# Patient Record
Sex: MALE | Race: BLACK OR AFRICAN AMERICAN | NOT HISPANIC OR LATINO | Employment: FULL TIME | URBAN - METROPOLITAN AREA
[De-identification: names, ages, dates, MRNs, and addresses within clinical notes are randomized per-mention and may not be internally consistent; named-entity substitution may affect disease eponyms.]

---
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
## 2024-02-18 ENCOUNTER — HOSPITAL ENCOUNTER (EMERGENCY)
Facility: HOSPITAL | Age: 19
Discharge: HOME | End: 2024-02-18
Attending: INTERNAL MEDICINE
Payer: MEDICAID

## 2024-02-18 VITALS
RESPIRATION RATE: 20 BRPM | HEART RATE: 84 BPM | SYSTOLIC BLOOD PRESSURE: 118 MMHG | OXYGEN SATURATION: 100 % | WEIGHT: 199 LBS | TEMPERATURE: 97 F | BODY MASS INDEX: 25.55 KG/M2 | DIASTOLIC BLOOD PRESSURE: 70 MMHG
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
## 2024-02-18 DIAGNOSIS — S83.92XA SPRAIN OF LEFT KNEE, UNSPECIFIED LIGAMENT, INITIAL ENCOUNTER: Primary | ICD-10-CM

## 2024-02-18 PROCEDURE — 99283 EMERGENCY DEPT VISIT LOW MDM: CPT | Mod: 25

## 2024-02-18 RX ORDER — MELOXICAM 15 MG/1
15 TABLET ORAL DAILY
Qty: 30 TABLET | Refills: 0 | Status: SHIPPED | OUTPATIENT
Start: 2024-02-18 | End: 2024-03-19

## 2024-02-18 NOTE — ED PROVIDER NOTES
Encounter Date: 2/18/2024       History     Chief Complaint   Patient presents with    Knee Injury     Pt states he was playing basketball and his left leg gave out on him just PTA.  Ppt having left knee pain and swelling.     Patient is a 18-year-old male presents emerged department complaints of left knee pain.  States the pain started while playing basketball.  He states he plan and felt the knee almost give out mainly due the pain.  There is a mild amount of swelling noted to the knee.  He is ambulatory but slightly guarded due to the pain.  He denies any worsening alleviating factors to the pain except for obviously weight-bearing makes the pain worse.  He denies any other complaints or associated symptoms at this time.      Review of patient's allergies indicates:  No Known Allergies  Past Medical History:   Diagnosis Date    Migraine headache      No past surgical history on file.  Family History   Problem Relation Age of Onset    No Known Problems Mother     No Known Problems Father      Social History     Tobacco Use    Smoking status: Never    Smokeless tobacco: Never   Substance Use Topics    Alcohol use: Not Currently    Drug use: Never     Review of Systems   Constitutional:  Negative for activity change, appetite change and fever.   HENT:  Negative for congestion, dental problem and sore throat.    Eyes:  Negative for discharge and itching.   Respiratory:  Negative for apnea, chest tightness and shortness of breath.    Cardiovascular:  Negative for chest pain.   Gastrointestinal:  Negative for abdominal distention, abdominal pain and nausea.   Endocrine: Negative for cold intolerance and heat intolerance.   Genitourinary:  Negative for decreased urine volume, dysuria, testicular pain and urgency.   Musculoskeletal:  Positive for arthralgias, joint swelling and myalgias. Negative for back pain.   Skin:  Negative for rash.   Neurological:  Negative for dizziness, facial asymmetry and weakness.    Hematological:  Does not bruise/bleed easily.   Psychiatric/Behavioral:  Negative for agitation and behavioral problems.    All other systems reviewed and are negative.      Physical Exam     Initial Vitals [02/18/24 1725]   BP Pulse Resp Temp SpO2   114/72 100 20 96.5 °F (35.8 °C) 98 %      MAP       --         Physical Exam    Nursing note and vitals reviewed.  Constitutional: Vital signs are normal. He appears well-developed and well-nourished.  Non-toxic appearance. He does not have a sickly appearance.   HENT:   Head: Normocephalic and atraumatic.   Right Ear: External ear normal.   Left Ear: External ear normal.   Eyes: Conjunctivae, EOM and lids are normal. Lids are everted and swept, no foreign bodies found.   Neck: Trachea normal and phonation normal. Neck supple. No thyroid mass and no thyromegaly present.   Normal range of motion.   Full passive range of motion without pain.     Cardiovascular:  Normal rate, regular rhythm, S1 normal, S2 normal, normal heart sounds, intact distal pulses and normal pulses.           Pulmonary/Chest: Breath sounds normal.   Musculoskeletal:         General: Tenderness and edema present.      Cervical back: Full passive range of motion without pain, normal range of motion and neck supple.      Comments: Full range of motion but painful with weight-bearing and slightly guarded due to the pain.     Lymphadenopathy:     He has no cervical adenopathy.   Neurological: He is alert and oriented to person, place, and time. He has normal strength.   Skin: Skin is warm, dry and intact. Capillary refill takes less than 2 seconds.   Psychiatric: He has a normal mood and affect. His speech is normal and behavior is normal. Judgment normal. Cognition and memory are normal.         ED Course   Procedures  Labs Reviewed - No data to display       Imaging Results              X-Ray Knee 3 View Left (Preliminary result)  Result time 02/18/24 17:46:53      Wet Read by Mauricio Metz FNP  (02/18/24 17:46:53, [facility], Emergency Medicine)    Wet read:  No obvious acute abnormality seen on plain film x-ray of the knee.  Pending radiologist's review.                                     Medications - No data to display  Medical Decision Making  Patient is a 18-year-old male presents emerged department complaints of left knee pain.  States the pain started while playing basketball.  He states he plan and felt the knee almost give out mainly due the pain.  There is a mild amount of swelling noted to the knee.  He is ambulatory but slightly guarded due to the pain.  He denies any worsening alleviating factors to the pain except for obviously weight-bearing makes the pain worse.  He denies any other complaints or associated symptoms at this time.    Problems Addressed:  Sprain of left knee, unspecified ligament, initial encounter: acute illness or injury     Details: Most likely just sprain of the knee but will refer to Orthopedics possible ligament tear.  Patient does have previous injury of the same knee so this is concerning.  Strict ER return precautions discussed for any change or worsening symptoms.  Discussed ice elevation and anti-inflammatory medication.    Amount and/or Complexity of Data Reviewed  Radiology: ordered and independent interpretation performed.    Risk  Prescription drug management.                                      Clinical Impression:  Final diagnoses:  [W19.XXXA] Fall  [S83.92XA] Sprain of left knee, unspecified ligament, initial encounter (Primary)          ED Disposition Condition    Discharge Stable          ED Prescriptions       Medication Sig Dispense Start Date End Date Auth. Provider    meloxicam (MOBIC) 15 MG tablet Take 1 tablet (15 mg total) by mouth once daily. 30 tablet 2/18/2024 3/19/2024 Mauricio Metz FNP          Follow-up Information       Follow up With Specialties Details Why Contact Info    Kirk Miranda MD Family  Medicine Schedule an appointment as soon as possible for a visit in 3 days For ER Follow Up. [address] [id number]  [phone]               Mauricio Metz, [facility]  02/18/24 7087

## 2024-03-01 ENCOUNTER — HOSPITAL ENCOUNTER (OUTPATIENT)
Dept: RADIOLOGY | Facility: HOSPITAL | Age: 19
Discharge: HOME | End: 2024-03-01
Attending: STUDENT IN AN ORGANIZED HEALTH CARE EDUCATION/TRAINING PROGRAM
Payer: MEDICAID

## 2024-03-01 ENCOUNTER — OFFICE VISIT (OUTPATIENT)
Dept: ORTHOPEDICS | Facility: CLINIC | Age: 19
End: 2024-03-01
Payer: MEDICAID

## 2024-03-01 VITALS
DIASTOLIC BLOOD PRESSURE: 69 MMHG | HEART RATE: 77 BPM | BODY MASS INDEX: 23.62 KG/M2 | WEIGHT: 190 LBS | SYSTOLIC BLOOD PRESSURE: 103 MMHG | HEIGHT: 75 IN

## 2024-03-01 DIAGNOSIS — S83.412A SPRAIN OF MEDIAL COLLATERAL LIGAMENT OF LEFT KNEE, INITIAL ENCOUNTER: Primary | ICD-10-CM

## 2024-03-01 DIAGNOSIS — S83.92XA SPRAIN OF LEFT KNEE, UNSPECIFIED LIGAMENT, INITIAL ENCOUNTER: ICD-10-CM

## 2024-03-01 PROCEDURE — 73564 X-RAY EXAM KNEE 4 OR MORE: CPT | Mod: TC,LT

## 2024-03-01 PROCEDURE — 99213 OFFICE O/P EST LOW 20 MIN: CPT | Mod: PBBFAC,25

## 2024-03-01 RX ORDER — NAPROXEN 500 MG/1
500 TABLET ORAL 2 TIMES DAILY WITH MEALS
Qty: 60 TABLET | Refills: 1 | Status: SHIPPED | OUTPATIENT
Start: 2024-03-01

## 2024-03-01 NOTE — PROGRESS NOTES
"Subjective:    Patient ID: Jo Ann Gomez is a 18 y.o. male  who presented to [facility] for a new visit.      Chief Complaint: Pain of the Left Knee      History of Present Illness:  Jo Ann Gomez presents to the clinic of traumatic acute left knee pain onset 1w5d (DOI 2/18). Patient was playing basketball and he twisted his left knee while running backwards. He admits to developing immediate swelling around his left knee and was unable to bear any weight after the injury. He went to the ER the same day and was discharged with a knee immobilizer and crutches. He stopped wearing the knee immobilizer due to discomfort a couple of days ago and discontinued using the crutches the day after the injury.  Currently he has 0/10 at rest, but it can exacerbate to a 8/10 at the retropatellar area with prolonged walking, prolonged standing, and twisting the left knee, and improves with rest. Treatment to date: oral analgesics and rest. Imaging to date: radiographs. Denies any other injury or surgery to his left knee. Overall he admits that the pain has significantly improved and he had no pain yesterday.  2 days ago he had left knee pain with prolonged walking during a fishing trip.     Knee Review of Systems:  Swelling?  yes  Instability?  yes  Mechanical sx?  yes (clicks)  <30 min AM stiffness? yes  Limited ROM? no  Fever/Chills? no       Objective:      Physical Exam:    /69   Pulse 77   Ht 6' 3" (1.905 m)   Wt 86.2 kg (190 lb)   BMI 23.75 kg/m²     Ortho/SPM Exam    Appearance:  Normal gait/station  FWB  Alignment: Left: normal Right: normal   Soft tissue swelling: Left: no Right: no  Effusion: Left:  Negative Right: Negative  Erythema: Left no Right: no  Ecchymosis: Left: no Right: no  Atrophy: Left: no Right: no    Palpation:  Knee Tenderness: Left: None Right: None    Range of motion:  Flexion (140): Left:  140 Right: 140  Extension (0): Left: 0 Right: " 0    Strength:  Extension: Left 5/5  Pain: no     Right 5/5 Pain: no  Flexion: Left 5/5 Pain: no Right   5/5 Pain: no        Special Tests:  Ballotable Effusion:Left: Negative Right: Negative   Fluid Wave: Left: Positive (minimal) Right: Negative   Crepitus: Left: Negative Right: Negative   Patellar grind test: Left: Positive  Right: Negative  Apprehension test: Left: Negative Right: Negative   Varus: @ 0, Left Negative Right: Negative.  @ 30, Left Negative  Right Negative   Valgus: @ 0, Left Negative Right: Negative.  @ 30, Left Negative  Right Negative  Lachman: Left: Negative Right: Negative   Ant Drawer: Left: Negative Right: Negative   Posterior Drawer: Left: Negative Right: Negative   Dial Test: Left: Not performed Right: Not performed   Annika: Left: Negative Right: Negative   Apley's: Left: Not performed Right: Not performed  Thessaly's: Left: Not performed Right: Not performed   Noble Compression: Left: Not performed Right: Not performed   Sofía: Left: Not performed Right: Not performed   + pain along the left MCL with valgus stress.       General appearance: NAD  Peripheral pulses: normal bilaterally   Reflexes: Left: normal Right normal   Sensation: normal    Labs:  Last A1c: The patient doesn't have any registry metric data available     Imaging:   Previous images reviewed.  X-rays ordered and performed today: yes  # of views: 4 Laterality: left  My Interpretation:  no fracture, dislocation, swelling or degenerative changes noted       Assessment:        Encounter Diagnoses   Code Name Primary?    S83.412A Sprain of medial collateral ligament of left knee, initial encounter Yes        Plan:    MDM: Prior external referring provider studies reviewed.   Dx:  Left MCL sprain, acute  Treatment Plan: Discussed with patient diagnosis, prognosis, and treatment recommendations. Education provided.    Date of injury February 18th (1 week and 5 days ago) -patient twisted his left knee while running backwards.   Pain and swelling have significantly improved.  He was last symptomatic 2 days ago.  Discussed conservative management, including avoiding aggravating activities, activity modification, cold and hot therapies, oral analgesics, formal physical therapy, and home exercise program.  Imaging: radiological studies ordered and independently reviewed; discussed with patient; pending radiologist interpretation.  We will consider advanced imaging if patient's symptoms do not improve with conservative management.  Weight Management: is paramount. Maintain healthy weight of a BMI of <24.9..   Activity: Activity as tolerated; HEP to include aerobic conditioning and strength training with non-painful activity. ROM/STG exercises. Proper footware; assistive devises to avoid limping.   Therapy: Physical Therapy  Medication: START naproxen 500 mg every 12 hours needed for pain . Please see your primary care physician for further refills.  RTC:  8 weeks.       This note is dictated using the M*Modal Fluency Direct word recognition program. There are word recognition mistakes that are occasionally missed on review.    Oleksandr Molina D.O.  Sports Medicine Fellow

## 2024-03-15 NOTE — PROGRESS NOTES
Faculty Attestation: Jo Ann Gomez  was seen in Sports Medicine Clinic. Discussed with Dr. Molina at the time of the visit. History of Present Illness, Physical Exam, and Assessment and Plan reviewed. Treatment plan is reasonable and appropriate. Compliance with treatment recommendations is important.  Radiology images independently reviewed and agree with fellow interpretation.  No procedure was performed.     Frandy Haley MD  Sports Medicine

## 2024-08-16 ENCOUNTER — HOSPITAL ENCOUNTER (OUTPATIENT)
Dept: RADIOLOGY | Facility: HOSPITAL | Age: 19
Discharge: HOME | End: 2024-08-16
Attending: FAMILY MEDICINE
Payer: MEDICAID

## 2024-08-16 DIAGNOSIS — M54.50 LOW BACK PAIN: ICD-10-CM

## 2024-08-16 PROCEDURE — 72100 X-RAY EXAM L-S SPINE 2/3 VWS: CPT | Mod: TC

## 2025-01-06 ENCOUNTER — HOSPITAL ENCOUNTER (EMERGENCY)
Facility: HOSPITAL | Age: 20
Discharge: HOME | End: 2025-01-06
Attending: INTERNAL MEDICINE
Payer: COMMERCIAL

## 2025-01-06 VITALS
WEIGHT: 204.38 LBS | HEART RATE: 80 BPM | DIASTOLIC BLOOD PRESSURE: 89 MMHG | TEMPERATURE: 98 F | RESPIRATION RATE: 18 BRPM | BODY MASS INDEX: 25.55 KG/M2 | SYSTOLIC BLOOD PRESSURE: 136 MMHG | OXYGEN SATURATION: 100 %
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
## 2025-01-06 DIAGNOSIS — S16.1XXA STRAIN OF NECK MUSCLE, INITIAL ENCOUNTER: ICD-10-CM

## 2025-01-06 PROCEDURE — 99284 EMERGENCY DEPT VISIT MOD MDM: CPT | Mod: 25

## 2025-01-06 PROCEDURE — 63600175 PHARM REV CODE 636 W HCPCS: Performed by: INTERNAL MEDICINE

## 2025-01-06 PROCEDURE — 96372 THER/PROPH/DIAG INJ SC/IM: CPT | Performed by: INTERNAL MEDICINE

## 2025-01-06 RX ORDER — TIZANIDINE 4 MG/1
4 TABLET ORAL 3 TIMES DAILY PRN
Qty: 30 TABLET | Refills: 0 | Status: SHIPPED | OUTPATIENT
Start: 2025-01-06 | End: 2025-01-16

## 2025-01-06 RX ORDER — ORPHENADRINE CITRATE 30 MG/ML
60 INJECTION INTRAMUSCULAR; INTRAVENOUS
Status: COMPLETED | OUTPATIENT
Start: 2025-01-06 | End: 2025-01-06

## 2025-01-06 RX ADMIN — ORPHENADRINE CITRATE 60 MG: 30 INJECTION, SOLUTION INTRAMUSCULAR; INTRAVENOUS at 09:01
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
